# Patient Record
Sex: MALE | Race: WHITE
[De-identification: names, ages, dates, MRNs, and addresses within clinical notes are randomized per-mention and may not be internally consistent; named-entity substitution may affect disease eponyms.]

---

## 2019-10-12 ENCOUNTER — HOSPITAL ENCOUNTER (EMERGENCY)
Dept: HOSPITAL 39 - ER | Age: 39
Discharge: HOME | End: 2019-10-12
Payer: SELF-PAY

## 2019-10-12 VITALS — TEMPERATURE: 99.4 F | DIASTOLIC BLOOD PRESSURE: 96 MMHG | OXYGEN SATURATION: 96 % | SYSTOLIC BLOOD PRESSURE: 140 MMHG

## 2019-10-12 DIAGNOSIS — Z87.891: ICD-10-CM

## 2019-10-12 DIAGNOSIS — K02.9: ICD-10-CM

## 2019-10-12 DIAGNOSIS — K04.7: Primary | ICD-10-CM

## 2019-10-12 NOTE — ED.PDOC
History of Present Illness





- General


Chief Complaint: Dental/Mouth


Stated Complaint: toothache


Time Seen by Provider: 10/12/19 21:34


Source: patient, RN notes reviewed, Vital Signs reviewed


Exam Limitations: no limitations





- History of Present Illness


Initial Comments: 





patient presents with complaints of a toothache.  It started a couple days ago 

but became progressively worse today.  Fever or chills.  Pain is throbbing and 

sharp in nature.  Worse with cold drinks are trying to eat.  Better when he has 

a warm compress on his face.  They, blurry vision, nausea, vomiting, diarrhea, 

chest pain, shortness of breath.


Timing/Duration: gradual


Severity: moderate


EENT Location: dental


Presenting Symptoms: tooth pain


Improving Factors: other - Heat


Worsening Factors: cold therapy, movement


Associated Symptoms: tooth pain


Allergies/Adverse Reactions: 


Allergies





NO KNOWN ALLERGY Allergy (Verified 10/12/19 21:15)


   





Home Medications: 


Ambulatory Orders





Clindamycin HCl [Cleocin] 300 mg PO Q6H #28 capsule 10/12/19 


Tramadol HCl [Ultram] 50 mg PO Q6HR #12 tab 10/12/19 











Review of Systems





- Review of Systems


Constitutional: States: no symptoms reported, see HPI


EENTM: States: see HPI, mouth pain


Respiratory: States: no symptoms reported


Cardiology: States: no symptoms reported


Gastrointestinal/Abdominal: States: no symptoms reported


Genitourinary: States: no symptoms reported


Musculoskeletal: States: no symptoms reported


Skin: States: no symptoms reported


Neurological: States: no symptoms reported


Endocrine: States: no symptoms reported


Hematologic/Lymphatic: States: no symptoms reported


All other Systems: Reviewed and Negative





Past Medical History (General)





- Patient Medical History


Hx Asthma: No


Hx Cardiac Disorders: No


Hx Hypertension: No


Hx Diabetes: No


Surgical History: no surgical history





- Social History


Hx Tobacco Use: Yes


Hx Alcohol Use: Yes - former





Family Medical History





- Family History


  ** Mother


Family History: Unknown





Physical Exam





- Physical Exam


General Appearance: Alert, No apparent distress, Well Developed, Well Groomed, W

ell Hydrated, Well Nourished


Eye Exam: bilateral normal, bilateral abnormal EOM


Ear Exam: bilateral ear: auricle normal


Nasal Exam: normal inspection


Throat Exam: pharynx normal, dental tenderness - tooth #6., other - o pharyngeal

 swelling.  No elevation of the floor the mouth.  No trismus.  Patient with a 

hole in the gum above tooth #6 where there is a draining abscess.


Neck: non-tender, full range of motion, supple, normal inspection, trachea 

midline


Cardiovascular/Respiratory: regular rate, rhythm, no M/R/G, normal peripheral 

pulses, no JVD, normal breath sounds, no respiratory distress


Abdominal Exam: non-tender, no organomegaly


Neurologic: CNs II-XII nml as tested, no motor/sensory deficits, alert, normal 

mood/affect, oriented x 3


Skin Exam: normal color, warm/dry





Progress





- Progress


Progress: 





10/12/19 21:48


differential diagnosis: Dental abscess, dental Arielle, foreign body in gum, 

fracture among others





Patient is being controlled at this point in time.  Plan on IM injection of 

clindamycin and then a prescription for clindamycin and Ultram for pain control.

  Patient to follow-up with his dentist next week.  I discussed the plan of care

 with the patient he voices understanding and agreement.





Dell Chin M.D.


#[phone]


10/12/19 21:48








Departure





- Departure


Clinical Impression: 


 Dental abscess, Dental caries





Time of Disposition: 21:49


Disposition: Discharge to Home or Self Care


Condition: Good


Departure Forms:  ED Discharge - Pt. Copy, Patient Portal Self Enrollment


Instructions:  DI for Dental Pain, Dental Pain (DC)


Prescriptions: 


Tramadol HCl [Ultram] 50 mg PO Q6HR #12 tab


Clindamycin HCl [Cleocin] 300 mg PO Q6H #28 capsule


Home Medications: 


Ambulatory Orders





Clindamycin HCl [Cleocin] 300 mg PO Q6H #28 capsule 10/12/19 


Tramadol HCl [Ultram] 50 mg PO Q6HR #12 tab 10/12/19